# Patient Record
Sex: FEMALE | URBAN - METROPOLITAN AREA
[De-identification: names, ages, dates, MRNs, and addresses within clinical notes are randomized per-mention and may not be internally consistent; named-entity substitution may affect disease eponyms.]

---

## 2024-11-18 ENCOUNTER — APPOINTMENT (RX ONLY)
Dept: URBAN - METROPOLITAN AREA CLINIC 41 | Facility: CLINIC | Age: 44
Setting detail: DERMATOLOGY
End: 2024-11-18

## 2024-11-18 DIAGNOSIS — I87.2 VENOUS INSUFFICIENCY (CHRONIC) (PERIPHERAL): ICD-10-CM | Status: INADEQUATELY CONTROLLED

## 2024-11-18 PROCEDURE — ? PRESCRIPTION

## 2024-11-18 PROCEDURE — ? COUNSELING

## 2024-11-18 PROCEDURE — ? PRESCRIPTION MEDICATION MANAGEMENT

## 2024-11-18 PROCEDURE — 99204 OFFICE O/P NEW MOD 45 MIN: CPT

## 2024-11-18 RX ORDER — TRIAMCINOLONE ACETONIDE 1 MG/G
CREAM TOPICAL BID
Qty: 80 | Refills: 2 | Status: ERX | COMMUNITY
Start: 2024-11-18

## 2024-11-18 RX ADMIN — TRIAMCINOLONE ACETONIDE: 1 CREAM TOPICAL at 00:00

## 2024-11-18 NOTE — PROCEDURE: PRESCRIPTION MEDICATION MANAGEMENT
Plan: Patient states she has compression stockings however not compliant because it gives her leg cramps. States she is not wearing them today because she just got back from a long car trip and did not want to wear them during the car trip and has not returned to using them.  Patient is also not compliant with her diuretic. States she only uses it when she knows she will be home and does not need to go out. Today, she did not take it because she was coming to this appointment and did not want to have to go to the bathroom while out and about. I advised patient to work with wound care or vascular surgery to get compression stockings that she can tolerate so that she will have improved compliance. Also recommend seeing vascular surgery to get vascular studies done. Patient has 3+ pitting edema today on exam. \\n\\nAlso discussed elevating legs when possible. Ulceration today is healing well, and is very shallow. Would continue Vaseline and bandage. \\n\\nDiscussed that TAC cream will only help with pruritus and erythema but will not overall help with the swelling.
Initiate Treatment: TAC 0.1% CREAM BID
Render In Strict Bullet Format?: No
Detail Level: Zone